# Patient Record
Sex: FEMALE | Race: BLACK OR AFRICAN AMERICAN | NOT HISPANIC OR LATINO | Employment: FULL TIME | URBAN - METROPOLITAN AREA
[De-identification: names, ages, dates, MRNs, and addresses within clinical notes are randomized per-mention and may not be internally consistent; named-entity substitution may affect disease eponyms.]

---

## 2017-01-06 DIAGNOSIS — R30.0 DYSURIA: ICD-10-CM

## 2017-04-20 ENCOUNTER — ALLSCRIPTS OFFICE VISIT (OUTPATIENT)
Dept: OTHER | Facility: OTHER | Age: 22
End: 2017-04-20

## 2017-12-18 DIAGNOSIS — R05.9 COUGH: ICD-10-CM

## 2018-01-02 ENCOUNTER — ALLSCRIPTS OFFICE VISIT (OUTPATIENT)
Dept: OTHER | Facility: OTHER | Age: 23
End: 2018-01-02

## 2018-01-03 NOTE — PROGRESS NOTES
Assessment   1  ADD (attention deficit disorder) (314 00) (F98 8)   2  Acne (706 1) (L70 9)   3  Acute upper respiratory infection (465 9) (J06 9)    Plan   Acne    · Junel FE 1/20 1-20 MG-MCG Oral Tablet; take 1 tablet by mouth every day    Discussion/Summary      Patient's birth control pills renewed for her acne  She is to continue on her Adderall for her ADHD  For her upper respiratory infection she is to use symptomatic treatment including saline nasal spray, cough medication, antihistamine  She is to follow up if she is not improving  The patient, patient's family was counseled regarding instructions for management,-- risk factor reductions,-- patient and family education,-- impressions,-- risks and benefits of treatment options,-- importance of compliance with treatment  Educational resources provided:    Possible side effects of new medications were reviewed with the patient/guardian today  The treatment plan was reviewed with the patient/guardian  The patient/guardian understands and agrees with the treatment plan       Self Referrals: No      Chief Complaint   Pt in office today for a c/u  Pt reports cough, sore throat, chest congestion  Symptoms started approx 4 days ago  Pt needs medication refills  Patient is here today for follow up of chronic conditions described in HPI  History of Present Illness   81-year-old female here today for a checkup for her ADHD and acne  She is currently on Adderall and Junel 1/20  Patient needs her Junel renewed  She just had her Adderall renewed recently  Patient is also having congestion and cough  She states she had a fever when she 1st began with her symptoms 4 days ago but does not have 1 now  She has not taken anything for it so far  The patient's ADHD subtype is the predominantly inattentive type  Her ADHD has been well controlled since the last visit  She has no comorbid illnesses  She has had no significant interval events      Target Symptoms: 1  Hyperactive behavior has improved  2  Impulsive behavior has improved  3  Difficulty concentrating has improved  The patient takes her medications all of the time  The side effects are described as tolerable  The patient is being seen for a routine clinic follow-up of acne  The patient is currently asymptomatic  No associated symptoms are reported  The patient is being seen for an initial evaluation of this episode of an upper respiratory infection  Symptoms: nasal congestion,-- runny nose,-- sore throat-- and-- productive cough, but-- no sneezing,-- no scratchy throat,-- no dry cough,-- no clear sputum,-- no colored sputum,-- no facial pain,-- no facial pressure,-- no ear pain-- and-- no hoarseness  Symptom Cluster Details: she reports the symptoms are unchanged  Associated Symptoms: no general malaise,-- no fever,-- no chills,-- no swollen lymph nodes,-- no headache,-- no myalgias,-- no arthralgias,-- no nausea,-- no vomiting-- and-- no diarrhea  Current Treatment: she is currently not being treated for this problem  Pertinent History: no pertinent medical history reported  Evaluation and Treatment History: she has not been previously evaluated for this problem  Review of Systems        Constitutional: as noted in HPI  Eyes: No complaints of eye pain, no red eyes, no eyesight problems, no discharge, no dry eyes, no itching of eyes  ENT: as noted in HPI  Cardiovascular: No complaints of slow heart rate, no fast heart rate, no chest pain, no palpitations, no leg claudication, no lower extremity edema  Respiratory: as noted in HPI  Gastrointestinal: No complaints of abdominal pain, no constipation, no nausea or vomiting, no diarrhea, no bloody stools  Musculoskeletal: No complaints of arthralgias, no myalgias, no joint swelling or stiffness, no limb pain or swelling  Integumentary: as noted in HPI  Psychiatric: as noted in HPI  ROS reviewed  Active Problems   1  Acne (706 1) (L70 9)   2  ADD (attention deficit disorder) (314 00) (F98 8)    Past Medical History   1  History of Birth control (V25 9) (Z30 9)   2  History of dysuria (V13 00) (Z87 898)   3  History of influenza vaccination (V49 89) (Z92 29)   4  History of persistent cough (V12 69) (Z87 09)   5  History of Need for hepatitis A immunization (V05 3) (Z23)   6  History of Patellar tendonitis (726 64) (M76 50)   7  History of Visit for Mantoux test (V74 1) (Z11 1)     The active problems and past medical history were reviewed and updated today  Surgical History   1  History of Oral Surgery Tooth Extraction     The surgical history was reviewed and updated today  Family History   Mother    1  Family history of Gestational diabetes  Father    2  Family history of Diabetes   3  Family history of Hypertension    Social History    · Always uses seat belt   · Does not use illicit drugs (T36 40) (Z78 9)   · Exercises regularly   · Never a smoker   · Single  The social history was reviewed and is unchanged  Current Meds    1  Amphetamine-Dextroamphet ER 10 MG Oral Capsule Extended Release 24 Hour; TAKE     1 CAPSULE DAILY FOR ADHD on continuing basis; Therapy: 88FFK4311 to (Last Rx:[id number]) Ordered   2  Amphetamine-Dextroamphetamine 10 MG Oral Tablet; 1 by mouth midday for continuing     therapy; Therapy: 17Hix2734 to (Last Rx:[id number]) Ordered   3  Junel FE 1/20 1-20 MG-MCG Oral Tablet; take 1 tablet by mouth every day; Therapy: 85HUZ0185 to (803 570 510)  Requested for: 04Jbo0552; Last     Rx:[id number] Ordered     The medication list was reviewed and updated today  Allergies   1   No Known Drug Allergies    Vitals   Vital Signs    Recorded: 79DNA0388 08:58AM   Temperature 98 F   Heart Rate 85   Systolic 955   Diastolic 62   Height 6 ft    Weight 203 lb    BMI Calculated 27 53   BSA Calculated 2 14   O2 Saturation 98     Physical Exam Constitutional      General appearance: No acute distress, well appearing and well nourished  Ears, Nose, Mouth, and Throat      External inspection of ears and nose: Normal        Otoscopic examination: Tympanic membranes translucent with normal light reflex  Canals patent without erythema  Nasal mucosa, septum, and turbinates: Normal without edema or erythema  Oropharynx: Normal with no erythema, edema, exudate or lesions  Pulmonary      Respiratory effort: No increased work of breathing or signs of respiratory distress  Auscultation of lungs: Clear to auscultation  Cardiovascular      Auscultation of heart: Normal rate and rhythm, normal S1 and S2, without murmurs  Abdomen      Abdomen: Non-tender, no masses  Liver and spleen: No hepatomegaly or splenomegaly  Lymphatic      Palpation of lymph nodes in neck: No lymphadenopathy  Musculoskeletal      Gait and station: Normal        Inspection/palpation of joints, bones, and muscles: Normal        Neurologic      Cranial nerves: Cranial nerves 2-12 intact         Psychiatric      Orientation to person, place, and time: Normal        Mood and affect: Normal           Signatures    Electronically signed by : BART Guzman; Jan 2 2018  9:21AM EST                       (Author)     Electronically signed by : MELL Barbosa ; Jan 2 2018  9:24AM EST

## 2018-01-10 NOTE — PROGRESS NOTES
Assessment    1  Encounter for preventive health examination (V70 0) (Z00 00)   2  ADD (attention deficit disorder) (314 00) (F98 8)   3  Birth control (V25 9) (Z30 9)    Plan  Acne    · Junel FE 1/20 1-20 MG-MCG Oral Tablet; take 1 tablet by mouth every day  ADD (attention deficit disorder)    · Amphetamine-Dextroamphetamine 10 MG Oral Tablet (Adderall); 1 by mouth  midday for continuing therapy   · Amphetamine-Dextroamphet ER 10 MG Oral Capsule Extended Release 24  Hour (Adderall XR); TAKE 1 CAPSULE DAILY FOR ADHD on continuing basis  Need for influenza vaccination    · Stop: Influenza    Discussion/Summary  health maintenance visit Currently, she eats a healthy diet and has an adequate exercise regimen  cervical cancer screening is not indicated Breast cancer screening: breast cancer screening is not indicated  Colorectal cancer screening: colorectal cancer screening is not indicated  Advice and education were given regarding nutrition, aerobic exercise, reproductive health and contraception  Patient discussion: discussed with the patient  Pt is to try adding a short acting Adderall midday to see if that helps  The patient was counseled regarding instructions for management, risk factor reductions, patient and family education, impressions, risks and benefits of treatment options, importance of compliance with treatment  Possible side effects of new medications were reviewed with the patient/guardian today  The treatment plan was reviewed with the patient/guardian  The patient/guardian understands and agrees with the treatment plan     Self Referrals: No      Chief Complaint  Pt  in office today for a check up  Pt  would like to discuss ADHD  No recent PAP test was done  History of Present Illness  HM, Adult Female: The patient is being seen for a health maintenance evaluation  General Health: The patient's health since the last visit is described as good  She has regular dental visits   She complains of vision problems  Vision care includes wearing glasses and having regular eye examinations  Lifestyle:  She consumes a diverse and healthy diet  She does not have any weight concerns  She exercises regularly  She does not use tobacco  She denies alcohol use  She denies drug use  Reproductive health: the patient is premenopausal   she uses contraception  For contraception, she uses oral contraception pills  Screening:   HPI: P is having problems with concentration as the day goes on  She is wondering if she needs to change medication  She needs her oral contraceptive renewed also  ADHD (Follow-Up): The patient's ADHD subtype is the predominantly inattentive type  Her ADHD has been poorly controlled since the last visit  She has no comorbid illnesses  Target Symptoms:   2  Impulsive behavior is denied  3  Difficulty concentrating is worse  Review of Systems    Constitutional: No fever, no chills, feels well, no tiredness, no recent weight gain or weight loss  Eyes: No complaints of eye pain, no red eyes, no eyesight problems, no discharge, no dry eyes, no itching of eyes  ENT: no complaints of earache, no loss of hearing, no nose bleeds, no nasal discharge, no sore throat, no hoarseness  Cardiovascular: No complaints of slow heart rate, no fast heart rate, no chest pain, no palpitations, no leg claudication, no lower extremity edema  Respiratory: No complaints of shortness of breath, no wheezing, no cough, no SOB on exertion, no orthopnea, no PND  Gastrointestinal: No complaints of abdominal pain, no constipation, no nausea or vomiting, no diarrhea, no bloody stools  Genitourinary: No complaints of dysuria, no incontinence, no pelvic pain, no dysmenorrhea, no vaginal discharge or bleeding  Musculoskeletal: No complaints of arthralgias, no myalgias, no joint swelling or stiffness, no limb pain or swelling     Integumentary: No complaints of skin rash or lesions, no itching, no skin wounds, no breast pain or lump  Neurological: No complaints of headache, no confusion, no convulsions, no numbness, no dizziness or fainting, no tingling, no limb weakness, no difficulty walking  Psychiatric: as noted in HPI  ROS reviewed  Active Problems    1  Acne (706 1) (L70 9)   2  ADD (attention deficit disorder) (314 00) (F98 8)   3  Birth control (V25 9) (Z30 9)   4  Dysuria (788 1) (R30 0)   5  Need for hepatitis A immunization (V05 3) (Z23)   6  Patellar tendonitis (726 64) (M76 50)   7  Persistent cough (786 2) (R05)   8  Visit for Mantoux test (V74 1) (Z11 1)    Surgical History    · History of Oral Surgery Tooth Extraction    Family History  Mother    · Family history of Gestational diabetes  Father    · Family history of Diabetes   · Family history of Hypertension    Social History    · Always uses seat belt   · Does not use illicit drugs (U74 75) (Z78 9)   · Exercises regularly   · Never a smoker   · Single    Current Meds   1  Amphetamine-Dextroamphet ER 10 MG Oral Capsule Extended Release 24 Hour; TAKE   1 CAPSULE DAILY FOR ADHD on continuing basis; Therapy: 57OOL7929 to (Last Rx:12Jan2017) Ordered   2  Gildess FE 1/20 1-20 MG-MCG TABS; take 1 tablet every day; Therapy: 17VNR7254 to (Last Rx:29Apr2015)  Requested for: 29Apr2015 Ordered    Allergies    1  No Known Drug Allergies    Vitals   Recorded: 20Apr2017 01:40PM   Heart Rate 95   Systolic 154   Diastolic 70   Height 6 ft    Weight 190 lb    BMI Calculated 25 77   BSA Calculated 2 09   O2 Saturation 98     Physical Exam    Constitutional   General appearance: No acute distress, well appearing and well nourished  Eyes   Conjunctiva and lids: No swelling, erythema or discharge  Pupils and irises: Equal, round and reactive to light  Ears, Nose, Mouth, and Throat   External inspection of ears and nose: Normal     Otoscopic examination: Tympanic membranes translucent with normal light reflex   Canals patent without erythema  Oropharynx: Normal with no erythema, edema, exudate or lesions  Pulmonary   Respiratory effort: No increased work of breathing or signs of respiratory distress  Auscultation of lungs: Clear to auscultation  Cardiovascular   Palpation of heart: Normal PMI, no thrills  Auscultation of heart: Normal rate and rhythm, normal S1 and S2, without murmurs  Examination of extremities for edema and/or varicosities: Normal     Abdomen   Abdomen: Non-tender, no masses  Liver and spleen: No hepatomegaly or splenomegaly  Lymphatic   Palpation of lymph nodes in neck: No lymphadenopathy  Musculoskeletal   Gait and station: Normal     Digits and nails: Normal without clubbing or cyanosis  Inspection/palpation of joints, bones, and muscles: Normal     Skin   Skin and subcutaneous tissue: Normal without rashes or lesions  Neurologic   Cranial nerves: Cranial nerves 2-12 intact  Reflexes: 2+ and symmetric  Sensation: No sensory loss  Psychiatric   Orientation to person, place, and time: Normal     Mood and affect: Normal        Signatures   Electronically signed by : BART Murcia;  Apr 20 2017  2:13PM EST                       (Author)    Electronically signed by : MELL Hi ; Apr 20 2017  4:48PM EST

## 2018-01-15 VITALS
DIASTOLIC BLOOD PRESSURE: 70 MMHG | BODY MASS INDEX: 25.73 KG/M2 | HEART RATE: 95 BPM | WEIGHT: 190 LBS | OXYGEN SATURATION: 98 % | SYSTOLIC BLOOD PRESSURE: 112 MMHG | HEIGHT: 72 IN

## 2018-01-16 NOTE — MISCELLANEOUS
Message  Pt has previous diagnosis of ADD for which she never took medication  She is now in college and having tremendous difficulty concentrating and studying  She is interested in trying low dose medication at this time  We will try her on Adderall XR 10 mg daily        Plan  ADD (attention deficit disorder)    · Amphetamine-Dextroamphet ER 10 MG Oral Capsule Extended Release 24 Hour  (Adderall XR); TAKE 1 CAPSULE DAILY FOR ADHD on continuing basis    Signatures   Electronically signed by : Mary Sanchez, [facility]; Jun 16 2016 10:38AM EST                       (Author)

## 2018-01-23 VITALS
BODY MASS INDEX: 27.5 KG/M2 | DIASTOLIC BLOOD PRESSURE: 62 MMHG | WEIGHT: 203 LBS | TEMPERATURE: 98 F | SYSTOLIC BLOOD PRESSURE: 124 MMHG | HEART RATE: 85 BPM | HEIGHT: 72 IN | OXYGEN SATURATION: 98 %

## 2020-04-07 ENCOUNTER — TELEPHONE (OUTPATIENT)
Dept: OBGYN CLINIC | Facility: CLINIC | Age: 25
End: 2020-04-07

## 2020-04-07 ENCOUNTER — TELEMEDICINE (OUTPATIENT)
Dept: FAMILY MEDICINE CLINIC | Facility: CLINIC | Age: 25
End: 2020-04-07
Payer: COMMERCIAL

## 2020-04-07 DIAGNOSIS — R30.0 DYSURIA: Primary | ICD-10-CM

## 2020-04-07 PROCEDURE — 99214 OFFICE O/P EST MOD 30 MIN: CPT | Performed by: PHYSICIAN ASSISTANT

## 2020-04-07 RX ORDER — NITROFURANTOIN 25; 75 MG/1; MG/1
100 CAPSULE ORAL 2 TIMES DAILY
Qty: 10 CAPSULE | Refills: 0 | Status: SHIPPED | OUTPATIENT
Start: 2020-04-07 | End: 2020-04-12